# Patient Record
Sex: MALE | Race: WHITE | URBAN - METROPOLITAN AREA
[De-identification: names, ages, dates, MRNs, and addresses within clinical notes are randomized per-mention and may not be internally consistent; named-entity substitution may affect disease eponyms.]

---

## 2018-07-28 PROBLEM — Z98.890 HISTORY OF CARDIAC CATHETERIZATION: Status: ACTIVE | Noted: 2018-07-28

## 2018-07-28 PROBLEM — I35.0 NONRHEUMATIC AORTIC VALVE STENOSIS: Status: ACTIVE | Noted: 2018-07-28

## 2018-07-30 ENCOUNTER — OFFICE VISIT (OUTPATIENT)
Dept: CARDIOLOGY CLINIC | Age: 68
End: 2018-07-30
Payer: COMMERCIAL

## 2018-07-30 VITALS
SYSTOLIC BLOOD PRESSURE: 138 MMHG | HEIGHT: 70 IN | HEART RATE: 69 BPM | RESPIRATION RATE: 14 BRPM | BODY MASS INDEX: 23.77 KG/M2 | DIASTOLIC BLOOD PRESSURE: 72 MMHG | WEIGHT: 166 LBS

## 2018-07-30 DIAGNOSIS — Z98.890 HISTORY OF CARDIAC CATHETERIZATION: ICD-10-CM

## 2018-07-30 DIAGNOSIS — I35.0 NONRHEUMATIC AORTIC VALVE STENOSIS: ICD-10-CM

## 2018-07-30 PROCEDURE — 99205 OFFICE O/P NEW HI 60 MIN: CPT | Performed by: INTERNAL MEDICINE

## 2018-07-30 PROCEDURE — 93000 ELECTROCARDIOGRAM COMPLETE: CPT | Performed by: INTERNAL MEDICINE

## 2018-07-30 RX ORDER — LISINOPRIL 5 MG/1
TABLET ORAL
COMMUNITY
Start: 2018-07-09

## 2018-07-30 NOTE — PROGRESS NOTES
Chief Complaint   Patient presents with    Cardiac Valve Problem       Patient Active Problem List    Diagnosis Date Noted    History of cardiac catheterization 07/28/2018     Overview Note:     A. Cath 7/12/2018 [facility]): normal cors.  Nonrheumatic aortic valve stenosis 07/28/2018     Overview Note:     A. QI [facility]):  MG 49 mm, normal EF, 4 cm root         Current Outpatient Prescriptions   Medication Sig Dispense Refill    lisinopril (PRINIVIL;ZESTRIL) 5 MG tablet        No current facility-administered medications for this visit. No Known Allergies    Vitals:    07/30/18 0737   BP: 138/72   Pulse: 69   Resp: 14   Weight: 166 lb (75.3 kg)   Height: 5' 10\" (1.778 m)       Social History     Social History    Marital status: Unknown     Spouse name: N/A    Number of children: N/A    Years of education: N/A     Occupational History    Not on file. Social History Main Topics    Smoking status: Never Smoker    Smokeless tobacco: Never Used    Alcohol use Yes    Drug use: No    Sexual activity: Not on file     Other Topics Concern    Not on file     Social History Narrative    No narrative on file       History reviewed. No pertinent family history. SUBJECTIVE: Le Nolasco presents to the office today for consult for AS  He complains of no cardiac symptoms and denies   chest pain, chest pressure/discomfort, claudication, dyspnea, exertional chest pressure/discomfort, fatigue, irregular heart beat, lower extremity edema, near-syncope, orthopnea, palpitations, paroxysmal nocturnal dyspnea, syncope and tachypnea. Life long Murmur - workup recenetly for murmur - told he needed AVR. Very active with exercise and assisted. Review of Systems:   Heart: as above   Lungs: as above   Eyes: denies changes in vision or discharge. Ears: denies changes in hearing or pain. Nose: denies epistaxis or masses   Throat: denies sore throat or trouble swallowing.    Neuro: denies numbness, tingling, tremors. Skin: denies rashes or itching. : denies hematuria, dysuria   GI: denies vomiting, diarrhea   Psych: denies mood changed, anxiety, depression. all others negative. Physical Exam   /72   Pulse 69   Resp 14   Ht 5' 10\" (1.778 m)   Wt 166 lb (75.3 kg)   BMI 23.82 kg/m²   Constitutional: Oriented to person, place, and time. Well-developed and well-nourished. No distress. Head: Normocephalic and atraumatic. Eyes: EOM are normal. Pupils are equal, round, and reactive to light. Neck: Normal range of motion. Neck supple. No hepatojugular reflux and no JVD present. Carotid bruit is not present. No tracheal deviation present. No thyromegaly present. Cardiovascular: Normal rate, regular rhythm, normal heart sounds and intact distal pulses. Severe AS murmur heard. Pulmonary/Chest: Effort normal and breath sounds normal. No respiratory distress. No wheezes. No rales. No tenderness. Abdominal: Soft. Bowel sounds are normal. No distension and no mass. No tenderness. No rebound and no guarding. Musculoskeletal: Normal range of motion. No edema and no tenderness. Lymphadenopathy:   No cervical adenopathy. No groin adenopathy. Neurological: Alert and oriented to person, place, and time. Skin: Skin is warm and dry. No rash noted. Not diaphoretic. No erythema. Psychiatric: Normal mood and affect. Behavior is normal.     EKG:  normal EKG, normal sinus rhythm. ASSESSMENT AND PLAN:  Patient Active Problem List   Diagnosis    History of cardiac catheterization    Nonrheumatic aortic valve stenosis     Reviewed cath, QI reports.   Patient states he was told he has a bicuspid valve, altho not stated as such in QI report, it owuld make sense  He has severe AS by exam  Only mild aortic aneusysm by records I have (4.0 cm)  Has appoitnemnt with [facility] tmrw at [facility] for AVR   Guidelines still state reserve AVR for SYMPTOMATIC severe AS, which he does not have, with